# Patient Record
Sex: FEMALE | Race: OTHER | Employment: FULL TIME | URBAN - NONMETROPOLITAN AREA
[De-identification: names, ages, dates, MRNs, and addresses within clinical notes are randomized per-mention and may not be internally consistent; named-entity substitution may affect disease eponyms.]

---

## 2021-10-09 ENCOUNTER — APPOINTMENT (OUTPATIENT)
Dept: GENERAL RADIOLOGY | Age: 26
End: 2021-10-09
Attending: STUDENT IN AN ORGANIZED HEALTH CARE EDUCATION/TRAINING PROGRAM
Payer: OTHER GOVERNMENT

## 2021-10-09 ENCOUNTER — HOSPITAL ENCOUNTER (EMERGENCY)
Age: 26
Discharge: HOME | End: 2021-10-10
Attending: STUDENT IN AN ORGANIZED HEALTH CARE EDUCATION/TRAINING PROGRAM | Admitting: STUDENT IN AN ORGANIZED HEALTH CARE EDUCATION/TRAINING PROGRAM
Payer: OTHER GOVERNMENT

## 2021-10-09 DIAGNOSIS — S82.235A CLOSED NONDISPLACED OBLIQUE FRACTURE OF SHAFT OF LEFT TIBIA, INITIAL ENCOUNTER: Primary | ICD-10-CM

## 2021-10-09 PROCEDURE — 73610 X-RAY EXAM OF ANKLE: CPT

## 2021-10-09 PROCEDURE — 75810000053 HC SPLINT APPLICATION

## 2021-10-09 PROCEDURE — 99283 EMERGENCY DEPT VISIT LOW MDM: CPT

## 2021-10-09 PROCEDURE — 73590 X-RAY EXAM OF LOWER LEG: CPT

## 2021-10-09 PROCEDURE — 74011250637 HC RX REV CODE- 250/637: Performed by: STUDENT IN AN ORGANIZED HEALTH CARE EDUCATION/TRAINING PROGRAM

## 2021-10-09 RX ORDER — ACETAMINOPHEN 500 MG
1000 TABLET ORAL ONCE
Status: COMPLETED | OUTPATIENT
Start: 2021-10-09 | End: 2021-10-09

## 2021-10-09 RX ORDER — HYDROCODONE BITARTRATE AND ACETAMINOPHEN 5; 325 MG/1; MG/1
2 TABLET ORAL
Status: COMPLETED | OUTPATIENT
Start: 2021-10-09 | End: 2021-10-10

## 2021-10-09 RX ORDER — CITALOPRAM 20 MG/1
20 TABLET, FILM COATED ORAL DAILY
COMMUNITY
End: 2021-10-12

## 2021-10-09 RX ORDER — IBUPROFEN 600 MG/1
600 TABLET ORAL ONCE
Status: COMPLETED | OUTPATIENT
Start: 2021-10-09 | End: 2021-10-09

## 2021-10-09 RX ADMIN — IBUPROFEN 600 MG: 600 TABLET, FILM COATED ORAL at 22:37

## 2021-10-09 RX ADMIN — ACETAMINOPHEN 1000 MG: 500 TABLET ORAL at 22:37

## 2021-10-09 NOTE — Clinical Note
[facility] EMERGENCY DEPT  [address] [id number] [phone]    Work/School Note    Date: 10/9/2021    To Whom It May concern:    Erik Lebron was seen and treated today in the emergency room by the following provider(s):  Attending Provider: Ana Roa MD.      Erik Lebron is excused from work/school on 10/10/2021 through 10/13/2021. She is medically clear to return to work/school on 10/14/2021.         Sincerely,          Hayward Prader, MD

## 2021-10-10 VITALS
RESPIRATION RATE: 21 BRPM | SYSTOLIC BLOOD PRESSURE: 131 MMHG | TEMPERATURE: 98.7 F | DIASTOLIC BLOOD PRESSURE: 85 MMHG | BODY MASS INDEX: 28.35 KG/M2 | OXYGEN SATURATION: 95 % | HEART RATE: 58 BPM | WEIGHT: 160 LBS | HEIGHT: 63 IN

## 2021-10-10 PROCEDURE — 74011250637 HC RX REV CODE- 250/637: Performed by: STUDENT IN AN ORGANIZED HEALTH CARE EDUCATION/TRAINING PROGRAM

## 2021-10-10 RX ORDER — IBUPROFEN 600 MG/1
600 TABLET ORAL
Qty: 30 TABLET | Refills: 0 | Status: SHIPPED | OUTPATIENT
Start: 2021-10-10 | End: 2021-11-04 | Stop reason: ALTCHOICE

## 2021-10-10 RX ORDER — HYDROCODONE BITARTRATE AND ACETAMINOPHEN 5; 325 MG/1; MG/1
1 TABLET ORAL
Qty: 12 TABLET | Refills: 0 | Status: SHIPPED | OUTPATIENT
Start: 2021-10-10 | End: 2021-10-13

## 2021-10-10 RX ADMIN — HYDROCODONE BITARTRATE AND ACETAMINOPHEN 2 TABLET: 5; 325 TABLET ORAL at 00:02

## 2021-10-10 NOTE — ED NOTES
Splint applied to lateral and posterior LLE per orders, cap refill < 3 sec after application. Crutches provided, teach back satisfactory.

## 2021-10-10 NOTE — DISCHARGE INSTRUCTIONS
Take Tylenol and Motrin every 6 hours for pain. If this is not enough take Norco as needed. July Ano is an addictive medication and should only be used as needed. Your pain will never fully go away. The point of pain medications is to make the pain tolerable. If you start having severe pain that is uncontrolled, tingling or numbness in your toes this is concerning for something called compartment syndrome and should prompt quick return to the emergency department. Keep your leg elevated.

## 2021-10-10 NOTE — ED NOTES
I have reviewed discharge instructions with the patient. The patient verbalized understanding.     Pt verbalized understanding not to drive while taking norco.

## 2021-10-10 NOTE — ED PROVIDER NOTES
HPI   Patient is a 78-year-old female who presents with left ankle pain. Patient was drinking have a good time with her friends. She states that she was dancing when she felt a sudden severe pain in her left ankle. She is unsure if she twisted it but she was definitely standing when this occurred. She then fell to the ground. She did not hit her head. She did not injure any other part of her body however after was unable to stand on her left foot due to severe pain. She did take 1 hydrocodone prior to arrival but it was not helping with her pain. She states that she feels like her whole foot is tingling and numb. She feels the pain worse to her lateral ankle but does also feel it up in her leg. She denies any other trauma or injury. No past medical history on file. No past surgical history on file. No family history on file. Social History     Socioeconomic History    Marital status: Not on file     Spouse name: Not on file    Number of children: Not on file    Years of education: Not on file    Highest education level: Not on file   Occupational History    Not on file   Tobacco Use    Smoking status: Not on file   Substance and Sexual Activity    Alcohol use: Not on file    Drug use: Not on file    Sexual activity: Not on file   Other Topics Concern    Not on file   Social History Narrative    Not on file     Social Determinants of Health     Financial Resource Strain:     Difficulty of Paying Living Expenses:    Food Insecurity:     Worried About Running Out of Food in the Last Year:     [address] in the Last Year:    Transportation Needs:     Lack of Transportation (Medical):      Lack of Transportation (Non-Medical):    Physical Activity:     Days of Exercise per Week:     Minutes of Exercise per Session:    Stress:     Feeling of Stress :    Social Connections:     Frequency of Communication with Friends and Family:     Frequency of Social Gatherings with Friends and Family:     Attends [employer] Services:     Active Member of Clubs or Organizations:     Attends Club or Organization Meetings:     Marital Status:    Intimate Partner Violence:     Fear of Current or Ex-Partner:     Emotionally Abused:     Physically Abused:     Sexually Abused: ALLERGIES: Patient has no allergy information on record. Review of Systems  Constitutional: No fever  HENT: No ear pain  Eyes: No change in vision  Respiratory: No SOB  Cardio: No chest pain  GI: No blood in stool  : No hematuria  MSK: No back pain  Skin: No rashes  Neuro: No headache    There were no vitals filed for this visit. Physical Exam   General: No acute distress  Head: Normocephalic, atraumatic  Psych: Cooperative and alert  Eyes: No scleral icterus, normal conjunctiva  ENT: Moist oral mucosa  Neck: Supple  CV: Regular rate and rhythm, no pitting edema, palpable DP pulses  Pulm: Clear breath sounds bilaterally without any wheezing or rhonchi, normal respiratory rate  GI: Normal bowel sounds, soft, non-tender  MSK: Tenderness palpation to head of fibula, no tenderness to femur, tenderness palpation to lateral ankle, no tenderness to the medial aspect of the ankle, soft compartments of tibia  Skin: No rashes, mild swelling without any bruising or skin changes noted to left ankle  Neuro: Alert and conversive    MDM   Patient is a 73-year-old female who presents with left ankle pain. Patient does have some minor swelling and tenderness on exam so we will obtain x-rays to look for signs of fracture. She will be given Tylenol and ibuprofen for her pain. Patient is otherwise hemodynamically stable. She is no other concerning findings of trauma or injury. Patient does admit to drinking but does not appear grossly intoxicated and is able to answer all questions appropriately. She is otherwise neurovascularly intact. X-rays show a midshaft oblique tibial fracture. Not displaced.     Spoke with on-call orthopedic surgeon to discuss that this would need surgical management now versus later versus monitoring for compartment syndrome. He states that this can be managed with a posterior mold and sugar tong splint below the knee and close follow-up with orthopedics. We did have a long discussion with the patient and her boyfriend about concerning findings of compartment syndrome and what to look out for. We discussed different regimens to treat her pain at home. Patient was placed in a splint. Afterward she was found to be neurovascularly intact. We discussed different concerns that can happen with a splint that should get a prompt return to the emergency department. Patient will be given crutches and was taught how to use them. Patient stable for discharge at this time. Patient is in agreement with the plan to be discharged at this time. All the patient's questions were answered. Patient was given written instructions on the diagnosis, and states understanding of the plan moving forward. We did discuss important signs and symptoms that should prompt quick return to the emergency department. Disposition: Patient was discharged home in stable condition.   They will follow up with orthopedics    Prescriptions: Norco, ibuprofen    Diagnosis: Acute left-sided tibial fracture  Procedures

## 2021-10-12 ENCOUNTER — OFFICE VISIT (OUTPATIENT)
Dept: ORTHOPEDIC SURGERY | Age: 26
End: 2021-10-12
Payer: OTHER GOVERNMENT

## 2021-10-12 VITALS — HEIGHT: 72 IN | WEIGHT: 165 LBS | BODY MASS INDEX: 22.35 KG/M2

## 2021-10-12 DIAGNOSIS — M25.562 LEFT KNEE PAIN, UNSPECIFIED CHRONICITY: Primary | ICD-10-CM

## 2021-10-12 DIAGNOSIS — S82.402A CLOSED FRACTURE OF LEFT TIBIA AND FIBULA, INITIAL ENCOUNTER: ICD-10-CM

## 2021-10-12 DIAGNOSIS — S82.202A CLOSED FRACTURE OF LEFT TIBIA AND FIBULA, INITIAL ENCOUNTER: ICD-10-CM

## 2021-10-12 PROCEDURE — 27750 TREATMENT OF TIBIA FRACTURE: CPT | Performed by: ORTHOPAEDIC SURGERY

## 2021-10-12 PROCEDURE — 99203 OFFICE O/P NEW LOW 30 MIN: CPT | Performed by: ORTHOPAEDIC SURGERY

## 2021-10-12 RX ORDER — CITALOPRAM 10 MG/1
TABLET ORAL
COMMUNITY
Start: 2021-08-20

## 2021-10-12 RX ORDER — OXYCODONE AND ACETAMINOPHEN 5; 325 MG/1; MG/1
1 TABLET ORAL
Qty: 30 TABLET | Refills: 0 | Status: SHIPPED | OUTPATIENT
Start: 2021-10-12 | End: 2021-10-26

## 2021-10-12 NOTE — PROGRESS NOTES
Name: Kevin Kumar    : 1995     Service Dept: [facility] and Sports Medicine    Patient's Pharmacies:    [address] [place] [address]  [address] [address]  Phone: [phone] Fax: [phone]       Chief Complaint   Patient presents with    Leg Pain        Visit Vitals   6' 1\" (1.854 m)   Wt 165 lb (74.8 kg)   LMP 10/06/2021 (Exact Date)   BMI 21.77 kg/m²      No Known Allergies   Current Outpatient Medications   Medication Sig Dispense Refill    citalopram (CELEXA) 10 mg tablet TAKE 1 TABLET BY MOUTH ONCE DAILY FOR 30 DAYS      HYDROcodone-acetaminophen (Norco) 5-325 mg per tablet Take 1 Tablet by mouth every six (6) hours as needed for Pain for up to 3 days. Max Daily Amount: 4 Tablets. 12 Tablet 0    ibuprofen (MOTRIN) 600 mg tablet Take 1 Tablet by mouth every six (6) hours as needed for Pain. 30 Tablet 0    levonorgestrel (KYLEENA IU) by IntraUTERine route. There is no problem list on file for this patient. History reviewed. No pertinent family history. Social History     Socioeconomic History    Marital status:      Spouse name: Not on file    Number of children: Not on file    Years of education: Not on file    Highest education level: Not on file   Tobacco Use    Smoking status: Never Smoker    Smokeless tobacco: Never Used   Substance and Sexual Activity    Drug use: Not Currently     Social Determinants of Health     Financial Resource Strain:     Difficulty of Paying Living Expenses:    Food Insecurity:     Worried About Running Out of Food in the Last Year:     [address] in the Last Year:    Transportation Needs:     Lack of Transportation (Medical):      Lack of Transportation (Non-Medical):    Physical Activity:     Days of Exercise per Week:     Minutes of Exercise per Session:    Stress:     Feeling of Stress :    Social Connections:     Frequency of Communication with Friends and Family:     Frequency of Social Gatherings with Friends and Family:     Attends Worship Services:     Active Member of Clubs or Organizations:     Attends Club or Organization Meetings:     Marital Status:       History reviewed. No pertinent surgical history. History reviewed. No pertinent past medical history. I have reviewed and agree with [facility] and KODY and intake form in chart and the record furthermore I have reviewed prior medical record(s) regarding this patients care during this appointment. Review of Systems:   Patient is a pleasant appearing individual, appropriately dressed, well hydrated, well nourished, who is alert, appropriately oriented for age, and in no acute distress with a crutch bound gait and normal affect who does not appear to be in any significant pain. Physical Exam:  Right Knee - Full Range of Motion, No crepitation, Grossly neurovascularly intact, Good cap refill, No skin lesion, No effusion, No gross instability, No point tenderness, No quadriceps weakness, No medial or lateral joint line tenderness, Negative Lachman's, Negative Tala's exam.   Encounter Diagnoses     ICD-10-CM ICD-9-CM   1. Left knee pain, unspecified chronicity  M25.562 719.46   2. Closed fracture of left tibia and fibula, initial encounter  S82.202A 823.82    S82.402A        HPI:  The patient is here with a chief complaint of left leg pain, sharp pain. She injured it when she jumped off the couch. It is the same. Nothing has helped. Pain is 8/10. ROS:  10-point review of systems is positive for fracture. X-rays of the left tibia are positive for slight oblique fracture of the tibia, AP and lateral in good alignment. Assessment/Plan:  Plan at this point, short leg cast.  We will repeat x-rays. If it looks good, we will have her continue strict nonweightbearing.   My nurse practitioner will see her back in approximately 2 weeks to repeat x-rays in the cast, AP and lateral. If it looks good, she will continue short leg cast for approximately 8 weeks from the time of her injury until there is callus formation. At that point, she will get converted to a Cam boot, and she will start 25% weightbearing 8 weeks from the time of her injury. She should have no restrictions at approximately 4 months when I will see her back for her final appointment with x-rays for clearance and go from there. As part of continued conservative pain management options the patient was advised to utilize Tylenol or OTC NSAIDS as long as it is not medically contraindicated. Return to Office: Follow-up and Dispositions    · Return in about 2 weeks (around 10/26/2021) for w/ Richa Kaminski, w/ xrays. Scribed by Brady Chand LPN as dictated by [facility] KATHY Lua MD.  Documentation True and Accepted Jimbo Lua MD

## 2021-10-12 NOTE — PATIENT INSTRUCTIONS
Knee Arthritis: Care Instructions  Your Care Instructions     Knee arthritis is a breakdown of the cartilage that cushions your knee joint. When the cartilage wears down, your bones rub against each other. This causes pain and stiffness. Knee arthritis tends to get worse with time. Treatment for knee arthritis involves reducing pain, making the leg muscles stronger, and staying at a healthy body weight. The treatment usually does not improve the health of the cartilage, but it can reduce pain and improve how well your knee works. You can take simple measures to protect your knee joints, ease your pain, and help you stay active. Follow-up care is a key part of your treatment and safety. Be sure to make and go to all appointments, and call your doctor if you are having problems. It's also a good idea to know your test results and keep a list of the medicines you take. How can you care for yourself at home? · Know that knee arthritis will cause more pain on some days than on others. · Stay at a healthy weight. Lose weight if you are overweight. When you stand up, the pressure on your knees from every pound of body weight is multiplied four times. So if you lose 10 pounds, you will reduce the pressure on your knees by 40 pounds. · Talk to your doctor or physical therapist about exercises that will help ease joint pain. ? Stretch to help prevent stiffness and to prevent injury before you exercise. You may enjoy gentle forms of yoga to help keep your knee joints and muscles flexible. ? Walk instead of jog.  ? Ride a bike. This makes your thigh muscles stronger and takes pressure off your knee. ? Wear well-fitting and comfortable shoes. ? Exercise in chest-deep water. This can help you exercise longer with less pain. ? Avoid exercises that include squatting or kneeling. They can put a lot of strain on your knees.   ? Talk to your doctor to make sure that the exercise you do is not making the arthritis worse.  · Do not sit for long periods of time. Try to walk once in a while to keep your knee from getting stiff. · Ask your doctor or physical therapist whether shoe inserts may reduce your arthritis pain. · If you can afford it, get new athletic shoes at least every year. This can help reduce the strain on your knees. · Use a device to help you do everyday activities. ? A cane or walking stick can help you keep your balance when you walk. Hold the cane or walking stick in the hand opposite the painful knee. ? If you feel like you may fall when you walk, try using crutches or a front-wheeled walker. These can prevent falls that could cause more damage to your knee. ? A knee brace may help keep your knee stable and prevent pain. ? You also can use other things to make life easier, such as a higher toilet seat and handrails in the bathtub or shower. · Take pain medicines exactly as directed. ? Do not wait until you are in severe pain. You will get better results if you take it sooner. ? If you are not taking a prescription pain medicine, take an over-the-counter medicine such as acetaminophen (Tylenol), ibuprofen (Advil, Motrin), or naproxen (Aleve). Read and follow all instructions on the label. ? Do not take two or more pain medicines at the same time unless the doctor told you to. Many pain medicines have acetaminophen, which is Tylenol. Too much acetaminophen (Tylenol) can be harmful. ? Tell your doctor if you take a blood thinner, have diabetes, or have allergies to shellfish. · Ask your doctor if you might benefit from a shot of steroid medicine into your knee. This may provide pain relief for several months. · Many people take the supplements glucosamine and chondroitin for osteoarthritis. Some people feel they help, but the medical research does not show that they work. Talk to your doctor before you take these supplements. When should you call for help?    Call your doctor now or seek immediate medical care if:    · You have sudden swelling, warmth, or pain in your knee.     · You have knee pain and a fever or rash.     · You have such bad pain that you cannot use your knee. Watch closely for changes in your health, and be sure to contact your doctor if you have any problems. Where can you learn more? Go to http://www.gray.com/  Enter W187 in the search box to learn more about \"Knee Arthritis: Care Instructions. \"  Current as of: April 30, 2021               Content Version: 13.0  © 1796-7545 [employer]. Care instructions adapted under license by [employer] (which disclaims liability or warranty for this information). If you have questions about a medical condition or this instruction, always ask your healthcare professional. [address] any warranty or liability for your use of this information.

## 2021-10-28 ENCOUNTER — OFFICE VISIT (OUTPATIENT)
Dept: ORTHOPEDIC SURGERY | Age: 26
End: 2021-10-28
Payer: OTHER GOVERNMENT

## 2021-10-28 DIAGNOSIS — Z09 FRACTURE FOLLOW-UP: Primary | ICD-10-CM

## 2021-10-28 PROCEDURE — 99024 POSTOP FOLLOW-UP VISIT: CPT | Performed by: NURSE PRACTITIONER

## 2021-10-28 NOTE — PROGRESS NOTES
Name: Katiana Thurman    : 1995    Weight Loss Metrics 10/12/2021 10/9/2021   Today's Wt 165 lb 160 lb   BMI 21.77 kg/m2 28.34 kg/m2       There is no height or weight on file to calculate BMI. Service Dept: [address] [place] [address] and Sports Medicine    Patient's Pharmacies:    [facility] [address] [place] [address]  [address] [address]  Phone: [phone] Fax: [phone]       Chief Complaint   Patient presents with    Leg Pain       HPI:  Patient follows up today for repeat x-rays and clinical check of a left tibia fracture. She is now 2 weeks status post that injury and remains in a short leg cast nonweightbearing on crutches. Visit Vitals  LMP 10/06/2021 (Exact Date)      No Known Allergies   Current Outpatient Medications   Medication Sig Dispense Refill    citalopram (CELEXA) 10 mg tablet TAKE 1 TABLET BY MOUTH ONCE DAILY FOR 30 DAYS      ibuprofen (MOTRIN) 600 mg tablet Take 1 Tablet by mouth every six (6) hours as needed for Pain. 30 Tablet 0    levonorgestrel (KYLEENA IU) by IntraUTERine route. There is no problem list on file for this patient. History reviewed. No pertinent family history. Social History     Socioeconomic History    Marital status:      Spouse name: Not on file    Number of children: Not on file    Years of education: Not on file    Highest education level: Not on file   Tobacco Use    Smoking status: Never Smoker    Smokeless tobacco: Never Used   Substance and Sexual Activity    Drug use: Not Currently     Social Determinants of Health     Financial Resource Strain:     Difficulty of Paying Living Expenses:    Food Insecurity:     Worried About Running Out of Food in the Last Year:     [address] in the Last Year:    Transportation Needs:     Lack of Transportation (Medical):      Lack of Transportation (Non-Medical):    Physical Activity:     Days of Exercise per Week:     Minutes of Exercise per Session:    Stress:     Feeling of Stress :    Social Connections:     Frequency of Communication with Friends and Family:     Frequency of Social Gatherings with Friends and Family:     Attends Islam Services:     Active Member of Clubs or Organizations:     Attends Club or Organization Meetings:     Marital Status:       History reviewed. No pertinent surgical history. History reviewed. No pertinent past medical history. I have reviewed and agree with [address] [facility] [address] and ROS and intake form in chart and the record furthermore I have reviewed prior medical record(s) regarding this patients care during this appointment. Review of Systems:   Patient is pleasant appearing individual, appropriately dressed, well hydrated, well nourished, who is alert, appropriately oriented for age, and in no acute distress with a normal affect who does not appear to be in any significant pain. Physical Exam:  On physical exam today the cast appears in good condition, dry and intact. Toes are warm and pink and she is able to move them all. X-rays taken today of the left tib-fib in the cast show her fracture to remain stable. There is no sign of callus formation yet. Encounter Diagnoses     ICD-10-CM ICD-9-CM   1. Fracture follow-up  Z09 V67.4       As part of continued conservative pain management options the patient was advised to utilize Tylenol or OTC NSAIDS as long as it is not medically contraindicated. Return to Office:   Today I advised the patient to continue nonweightbearing on her crutches. She will remain in the cast for 6 more weeks and return at that time for removal.  We will get new x-rays of the left tib-fib at that visit and plan to advance her to a walking boot beginning with 25% weightbearing. We will plan on no restrictions at approximately 4 months from the date of her injury. She was given a letter today allowing her to return to desk duty at work only. [address] [facility]

## 2021-10-28 NOTE — LETTER
NOTIFICATION RETURN TO WORK / SCHOOL    10/28/2021 10:59 AM    Ms. [address] [address]      To Whom It May Concern:    Domenico Figueroa is currently under the care of [address] [place] [address]. She will return to work on: 11/1/2021 with desk duty restrictions for 6 weeks due to a tibia fracture. Patient should be allowed to elevate the left leg while working at her desk. If there are questions or concerns please have the patient contact our office.         Sincerely,      NITZA Gloria

## 2021-11-04 ENCOUNTER — OFFICE VISIT (OUTPATIENT)
Dept: ORTHOPEDIC SURGERY | Age: 26
End: 2021-11-04
Payer: OTHER GOVERNMENT

## 2021-11-04 DIAGNOSIS — Z09 FRACTURE FOLLOW-UP: Primary | ICD-10-CM

## 2021-11-04 PROCEDURE — 99024 POSTOP FOLLOW-UP VISIT: CPT | Performed by: NURSE PRACTITIONER

## 2021-11-04 RX ORDER — IBUPROFEN 800 MG/1
800 TABLET ORAL
Qty: 60 TABLET | Refills: 1 | Status: SHIPPED | OUTPATIENT
Start: 2021-11-04

## 2021-11-04 RX ORDER — BACLOFEN 20 MG/1
20 TABLET ORAL
Qty: 30 TABLET | Refills: 1 | Status: SHIPPED | OUTPATIENT
Start: 2021-11-04

## 2021-11-04 NOTE — PROGRESS NOTES
Name: Nahum Penaloza    : 1995    Weight Loss Metrics 10/12/2021 10/9/2021   Today's Wt 165 lb 160 lb   BMI 21.77 kg/m2 28.34 kg/m2       There is no height or weight on file to calculate BMI. Service Dept: [facility] and Sports Medicine    Patient's Pharmacies:    [facility] [address] [place] [address]  [address] [address]  Phone: [phone] Fax: [phone]       Chief Complaint   Patient presents with    Leg Pain       HPI:  Patient follows up today with complaint of severe numbness tingling and cramping throughout her left leg which started suddenly around 10 AM this morning. She states that she has been doing desk work at her job but she is still nonweightbearing on crutches. Otherwise no new injuries. She denies any back pain associated with her symptoms. Visit Vitals  LMP 10/06/2021 (Exact Date)      No Known Allergies   Current Outpatient Medications   Medication Sig Dispense Refill    ibuprofen (MOTRIN) 800 mg tablet Take 1 Tablet by mouth three (3) times daily as needed for Pain. 60 Tablet 1    baclofen (LIORESAL) 20 mg tablet Take 1 Tablet by mouth three (3) times daily as needed for Pain. 30 Tablet 1    citalopram (CELEXA) 10 mg tablet TAKE 1 TABLET BY MOUTH ONCE DAILY FOR 30 DAYS      levonorgestrel (KYLEENA IU) by IntraUTERine route. There is no problem list on file for this patient. History reviewed. No pertinent family history. Social History     Socioeconomic History    Marital status:    Tobacco Use    Smoking status: Never Smoker    Smokeless tobacco: Never Used   Substance and Sexual Activity    Drug use: Not Currently      History reviewed. No pertinent surgical history. History reviewed. No pertinent past medical history.      I have reviewed and agree with [address] [place] [address] Nw and ROS and intake form in chart and the record furthermore I have reviewed prior medical record(s) regarding this patients care during this appointment. Review of Systems:   Patient is pleasant appearing individual, appropriately dressed, well hydrated, well nourished, who is alert, appropriately oriented for age, and in moderate pain. Physical Exam:  On physical exam today she does have tenderness in her left glutes. Sensation is intact to her toes and she is able to wiggle all of them. X-rays taken of her left tib-fib in the cast show no change in her fracture alignment. Encounter Diagnoses     ICD-10-CM ICD-9-CM   1. Fracture follow-up  Z09 V67.4       As part of continued conservative pain management options the patient was advised to utilize Tylenol or OTC NSAIDS as long as it is not medically contraindicated. Return to Office:   Today I reviewed the clinical and radiographic findings with the patient. I think that her symptoms are likely due to the way she is having to hold the leg while she is nonweightbearing on crutches. They could also be radicular from a pinched nerve in her back. I am going to send in prescriptions for ibuprofen 800 mg as well as a muscle relaxer for the cramping. I am going to write her off of work until Monday, 11/8/2021 and I have encouraged her to use heat and rest over the next few days with her medications. She will follow up at her next regularly scheduled appointment for danica-rays of the left tib-fib.          [address] [facility]

## 2021-11-24 ENCOUNTER — OFFICE VISIT (OUTPATIENT)
Dept: ORTHOPEDIC SURGERY | Age: 26
End: 2021-11-24
Payer: OTHER GOVERNMENT

## 2021-11-24 DIAGNOSIS — M89.8X6 PAIN IN LEFT TIBIA: Primary | ICD-10-CM

## 2021-11-24 PROCEDURE — 99024 POSTOP FOLLOW-UP VISIT: CPT | Performed by: ORTHOPAEDIC SURGERY

## 2021-11-24 PROCEDURE — L4360 PNEUMAT WALKING BOOT PRE CST: HCPCS | Performed by: ORTHOPAEDIC SURGERY

## 2021-11-24 NOTE — PATIENT INSTRUCTIONS
Leg Pain: Care Instructions  Your Care Instructions  Many things can cause leg pain. Too much exercise or overuse can cause a muscle cramp (or charley horse). You can get leg cramps from not eating a balanced diet that has enough potassium, calcium, and other minerals. If you do not drink enough fluids or are taking certain medicines, you may develop leg cramps. Other causes of leg pain include injuries, blood flow problems, nerve damage, and twisted and enlarged veins (varicose veins). You can usually ease pain with self-care. Your doctor may recommend that you rest your leg and keep it elevated. Follow-up care is a key part of your treatment and safety. Be sure to make and go to all appointments, and call your doctor if you are having problems. It's also a good idea to know your test results and keep a list of the medicines you take. How can you care for yourself at home? · Take pain medicines exactly as directed. ? If the doctor gave you a prescription medicine for pain, take it as prescribed. ? If you are not taking a prescription pain medicine, ask your doctor if you can take an over-the-counter medicine. · Take any other medicines exactly as prescribed. Call your doctor if you think you are having a problem with your medicine. · Rest your leg while you have pain, and avoid standing for long periods of time. · Prop up your leg at or above the level of your heart when possible. · Make sure you are eating a balanced diet that is rich in calcium, potassium, and magnesium, especially if you are pregnant. · If directed by your doctor, put ice or a cold pack on the area for 10 to 20 minutes at a time. Put a thin cloth between the ice and your skin. · Your leg may be in a splint, a brace, or an elastic bandage, and you may have crutches to help you walk. Follow your doctor's directions about how long to wear supports and how to use the crutches. When should you call for help?    Call 911 anytime you think you may need emergency care. For example, call if:    · You have sudden chest pain and shortness of breath, or you cough up blood.     · Your leg is cool or pale or changes color. Call your doctor now or seek immediate medical care if:    · You have increasing or severe pain.     · Your leg suddenly feels weak and you cannot move it.     · You have signs of a blood clot, such as:  ? Pain in your calf, back of the knee, thigh, or groin. ? Redness and swelling in your leg or groin.     · You have signs of infection, such as:  ? Increased pain, swelling, warmth, or redness. ? Red streaks leading from the sore area. ? Pus draining from a place on your leg. ? A fever.     · You cannot bear weight on your leg. Watch closely for changes in your health, and be sure to contact your doctor if:    · You do not get better as expected. Where can you learn more? Go to http://www.gray.com/  Enter F224 in the search box to learn more about \"Leg Pain: Care Instructions. \"  Current as of: July 1, 2021               Content Version: 13.0  © 8745-0049 [employer]. Care instructions adapted under license by [employer] (which disclaims liability or warranty for this information). If you have questions about a medical condition or this instruction, always ask your healthcare professional. [place] [address] any warranty or liability for your use of this information.

## 2021-11-24 NOTE — PROGRESS NOTES
Name: Linsey Cordero    : 1995     Service Dept: [address] [place] [address] and Sports Medicine    Chief Complaint   Patient presents with    Leg Pain        There were no vitals taken for this visit. No Known Allergies     Current Outpatient Medications   Medication Sig Dispense Refill    ibuprofen (MOTRIN) 800 mg tablet Take 1 Tablet by mouth three (3) times daily as needed for Pain. 60 Tablet 1    baclofen (LIORESAL) 20 mg tablet Take 1 Tablet by mouth three (3) times daily as needed for Pain. 30 Tablet 1    citalopram (CELEXA) 10 mg tablet TAKE 1 TABLET BY MOUTH ONCE DAILY FOR 30 DAYS      levonorgestrel (KYLEENA IU) by IntraUTERine route. There is no problem list on file for this patient. History reviewed. No pertinent family history. Social History     Socioeconomic History    Marital status:    Tobacco Use    Smoking status: Never Smoker    Smokeless tobacco: Never Used   Substance and Sexual Activity    Drug use: Not Currently      History reviewed. No pertinent surgical history. History reviewed. No pertinent past medical history. I have reviewed and agree with [facility] and ROS and intake form in chart and the record furthermore I have reviewed prior medical record(s) regarding this patients care during this appointment. Review of Systems:   Patient is a pleasant appearing individual, appropriately dressed, well hydrated, well nourished, who is alert, appropriately oriented for age, and in no acute distress with a normal gait and normal affect who does not appear to be in any significant pain. Please note that a DME was provided from our office and fitted to an appropriate size. DME provided will help decrease soft tissue swelling, assist with stabilization, and decrease pain with immobilization.     Right Knee - Full Range of Motion, No crepitation, Grossly neurovascularly intact, Good cap refill, No skin lesion, No effusion, No gross instability, No point tenderness, No quadriceps weakness, No medial or lateral joint line tenderness, Negative Lachman's, Negative Tala's exam.      Encounter Diagnoses     ICD-10-CM ICD-9-CM   1. Pain in left tibia  M89.8X6 733.90       HPI:  The patient is here with a chief complaint of left leg pain. Diagnosed with tib-fib fracture. We put her into a cast.  Her injury was on 10/10/2021. She is doing well. She got her cast wet. She is approximately 6 to 7 weeks out from her injury. X-rays show that she has got well-aligned fracture with some early callus formation but no full healing. Assessment/Plan:  Plan at this point, we will cut the cast off, and we will repeat x-rays in about 4 to 6 weeks. We will convert her into a Cam boot, strict nonweightbearing for now. Next time, she sees my nurse practitioner, she will be approximately 2-1/2 months out, and she will go to progressive weightbearing 25% and increase by 25% each week next time my nurse practitioner sees her back. I will see her one more time after that, in approximately 4 weeks after doing that, and we will also put her into formal therapy next time as well, and we will go from there. As part of continued conservative pain management options the patient was advised to utilize Tylenol or OTC NSAIDS as long as it is not medically contraindicated. Return to Office: Follow-up and Dispositions    · Return in 4 weeks (on 12/22/2021) for jose ramon Mandel/ Blanca. Follow-up and Disposition History           Scribed by Mercedez Clark as dictated by Alessandra Pat. Shashi Kunz MD.  Documentation True and Accepted Jimbo Kunz MD

## 2021-12-30 ENCOUNTER — OFFICE VISIT (OUTPATIENT)
Dept: ORTHOPEDIC SURGERY | Age: 26
End: 2021-12-30
Payer: OTHER GOVERNMENT

## 2021-12-30 DIAGNOSIS — S82.232D CLOSED DISPLACED OBLIQUE FRACTURE OF SHAFT OF LEFT TIBIA WITH ROUTINE HEALING, SUBSEQUENT ENCOUNTER: Primary | ICD-10-CM

## 2021-12-30 PROCEDURE — 99024 POSTOP FOLLOW-UP VISIT: CPT | Performed by: NURSE PRACTITIONER

## 2021-12-30 NOTE — PROGRESS NOTES
Name: Maurisio Durbin    : 1995    Weight Loss Metrics 10/12/2021 10/9/2021   Today's Wt 165 lb 160 lb   BMI 21.77 kg/m2 28.34 kg/m2       There is no height or weight on file to calculate BMI. Service Dept: [address] [place] [address] and Sports Medicine    Patient's Pharmacies:    [facility] [address] [place] [address], [address] [place]  [address] [facility]  [address] [address]  Phone: [phone] Fax: [phone]       No chief complaint on file. HPI:  Patient follows up today for repeat x-rays and evaluation of a left tibia fracture. Initial injury was on 10/9/2021. She is now in a cam boot and ambulating nonweightbearing with crutches. She has no complaints today. There were no vitals taken for this visit. No Known Allergies   Current Outpatient Medications   Medication Sig Dispense Refill    ibuprofen (MOTRIN) 800 mg tablet Take 1 Tablet by mouth three (3) times daily as needed for Pain. 60 Tablet 1    baclofen (LIORESAL) 20 mg tablet Take 1 Tablet by mouth three (3) times daily as needed for Pain. 30 Tablet 1    citalopram (CELEXA) 10 mg tablet TAKE 1 TABLET BY MOUTH ONCE DAILY FOR 30 DAYS      levonorgestrel (KYLEENA IU) by IntraUTERine route. There is no problem list on file for this patient. History reviewed. No pertinent family history. Social History     Socioeconomic History    Marital status:    Tobacco Use    Smoking status: Never Smoker    Smokeless tobacco: Never Used   Substance and Sexual Activity    Drug use: Not Currently      History reviewed. No pertinent surgical history. History reviewed. No pertinent past medical history. I have reviewed and agree with [address] [place] [address] Nw and ROS and intake form in chart and the record furthermore I have reviewed prior medical record(s) regarding this patients care during this appointment. Physical Exam:  On physical exam today there is minimal swelling along the left lower leg ankle and foot.   She is neurovascularly intact. Repeat x-rays taken today of the left tib-fib show progressive interval healing of her tibia fracture. Encounter Diagnoses     ICD-10-CM ICD-9-CM   1. Closed displaced oblique fracture of shaft of left tibia with routine healing, subsequent encounter  S82.232D V54.16       As part of continued conservative pain management options the patient was advised to utilize Tylenol or OTC NSAIDS as long as it is not medically contraindicated. Return to Office: They reviewed the clinical and radiographic findings with the patient and recommended that she could begin progressive weightbearing on the left lower extremity. She will start with toe-touch this week with both crutches and then progressed by 25% weightbearing each week thereafter until she reaches full weightbearing in about another month. She will continue in the boot through this course. I will also order physical therapy to begin with some lower extremity strengthening and she will follow up in 4 more weeks for a repeat weightbearing x-ray of the left tib-fib.          [address] [facility]

## 2022-01-05 ENCOUNTER — HOSPITAL ENCOUNTER (OUTPATIENT)
Dept: PHYSICAL THERAPY | Age: 27
Discharge: HOME | End: 2022-01-05
Payer: OTHER GOVERNMENT

## 2022-01-05 PROCEDURE — 97162 PT EVAL MOD COMPLEX 30 MIN: CPT

## 2022-01-05 PROCEDURE — 97530 THERAPEUTIC ACTIVITIES: CPT

## 2022-01-05 NOTE — PROGRESS NOTES
[address] [facility] Kristina Haas, [address] [facility], [address] [place] [address]  PLAN OF CARE / STATEMENT OF MEDICAL NECESSITY FOR PHYSICAL THERAPY SERVICES  Patient Name: Arya Joy : 1995   Medical   Diagnosis: Displaced oblique fracture of shaft of left tibia, subsequent encounter for closed fracture with routine healing [S82.232D] Treatment Diagnosis: R26.2   Onset Date: 10/6/21     Referral Source: [facility] Gallardo [facility]): 2022   Prior Hospitalization: See medical history Provider #: [id number]   Prior Level of Function: Ind   Comorbidities:    Medications: Verified on Patient Summary List   The Plan of Care and following information is based on the information from the initial evaluation.   ==========================================================================================  Assessment / Functional Analysis:    Pt is a 22 y/o female who presents to physical therapy s/p L tibia spiral fracture of shaft on 10/6/21 after a fall. Pt attended orthopedic follow-up appointment last week and was given TTWB status with 25% aded each week until she is FWB. Pt received in waiting area ambulating with crutches and boot on L lower leg. Pt presents today with decreased ankle ROM, decreased strength, increased pain, and decreased ability to ambulate with normalized gait pattern.  Pt could benefit from skilled PT services to address the above impairments and to improve Pt ability to participate in functional activities of choice.    ==========================================================================================  Eval Complexity: History: MEDIUM  Complexity : 1-2 comorbidities / personal factors will impact the outcome/ POC Exam:LOW Complexity : 1-2 Standardized tests and measures addressing body structure, function, activity limitation and / or participation in recreation  Presentation: LOW Complexity : Stable, uncomplicated  Clinical Decision Making:MEDIUM Complexity : FOTO score of 26-74Overall Complexity:MEDIUM    Problem List: decrease ROM, decrease strength, edema affecting function, impaired gait/ balance, decrease ADL/ functional abilitiies, decrease activity tolerance, decrease flexibility/ joint mobility and decrease transfer abilities   Treatment Plan may include any combination of the following: Therapeutic exercise, Therapeutic activities, Neuromuscular re-education, Physical agent/modality, Gait/balance training, Manual therapy and Patient education  Patient / Family readiness to learn indicated by: asking questions and interest  Persons(s) to be included in education: patient (P)  Barriers to Learning/Limitations: None      Patient self reported health status: good  Rehabilitation Potential: excellent    Objective Measures: FOTO: 44  LEFS: 28.4      ROM / Strength  []? Unable to assess                            AROM                         PROM                       Strength       Left Right Left Right Left Right   Hip Flexion                 Extension                 Abduction                 Adduction               Knee Flexion                 Extension               Ankle Plantarflexion 28 37     2+/5  5/5     Dorsiflexion  Inversion  Eversion 6  30  7 15  41  16     2+/5   2+/5   2+/5   5/5  5/5  5/5        Short Term Goals: 3 weeks  1. Pt will report compliance and demonstrate ability to correctly perform HEP. 2. Pt will demonstrate a 1/2 grade improvement in LLE MMT to be able to better ambulate with a normalized gait without pain. 3. Pt will demonstrate L ankle PROM of 70 deg total DF/PF to facilitate increased ability to ascend/descend stairs. 4. Pt will increase LEFS > 10 points to facilitate increased ability to perform functional activities of choice. Long Term Goals: 6 weeks  1. Pt will demonstrate a 2 grade improvement in LLE MMT to be able to better ambulate with a normalized gait without pain.   2. Pt will demonstrate L ankle AROM of 80 deg or greater to be able to return to normal ambulation on level and unlevel surfaces. 3. Pt will demonstrate the ability to safely ambulate > 500 feet without an AD without evidence of antalgia to be able to return to an ind walking program following discharge. 4. Pt will increase LEFS > 20 points to facilitate increased ability to perform leisure activities of choice. Frequency / Duration: Patient to be seen  1-2  times per week for 6  weeks:  Patient / Caregiver education and instruction: self care, activity modification, brace/ splint application and exercises    Therapist Signature: Jeffery Flores PT, DPT Date: 3/8/9787   Certification Period: 1/5/22 - 3/5/22 Time: 10:36 AM   ===========================================================================================  I certify that the above Physical Therapy Services are being furnished while the patient is under my care. I agree with the treatment plan and certify that this therapy is necessary. Physician Signature:        Date:       Time:     Please sign and return to [address] [place] [address] PT or you may fax the signed copy to [phone]. Please call [phone] if more information required. Thank you.

## 2022-01-05 NOTE — PROGRESS NOTES
ANKLE EVAL/ PT DAILY TREATMENT NOTE 10-18    Patient Name: Camden Ott  Date:2022  : 1995  [x]  Patient  Verified  Payor: AGUILAR / Plan: Alirio Gibson 74 / Product Type:  /    In time: 818  Out time: 903  Total Treatment Time (min): 45  Visit #: 1    Medicare/BCBS Only   Total Timed Codes (min):  15 1:1 Treatment Time:  45     Treatment Area: Displaced oblique fracture of shaft of left tibia, subsequent encounter for closed fracture with routine healing [S82.232D]    SUBJECTIVE  Pt is a 22 y/o female who presents to physical therapy s/p L tibia spiral fracture on 10/6/21 after a fall. Pt attended orthopedic follow-up appointment last week and was given TTWB status with 25% aded each week until she is FWB. Pt received in waiting area ambulating with crutches and boot on L lower leg. Pt presents today with decreased ankle ROM, decreased strength, increased pain, and decreased ability to ambulate with normalized gait pattern. Pt could benefit from skilled PT services to address the above impairments and to improve Pt ability to participate in functional activities of choice. Pt. Goals: get back to running, be able to stand more than 15 minutes     Pain Level (0-10 scale): Current: 0/10 Best: 0/10  Worst: 0/10    []constant []intermittent []improving []worsening []no change since onset      PMH: NA    Imagin21 X-rays taken of the left tib-fib show progressive interval healing of her tibia fracture.   Prior Treatment: NA      Any medication changes, allergies to medications, adverse drug reactions, diagnosis change, or new procedure performed?: [x] No    [] Yes (see summary sheet for update)          OBJECTIVE/EXAMINATION  Palpation: No tenderness to palpation    Posture: [] Varus    [] Valgus          [] Tibial Torsion    [] Foot Supination    [] Foot Pronation    Describe:      ROM / Strength  [] Unable to assess                    AROM                         PROM Strength     Left Right Left Right Left Right   Hip Flexion          Extension          Abduction          Adduction         Knee Flexion          Extension         Ankle Plantarflexion 28 37    5/5    Dorsiflexion  Inversion  Eversion 6 15  41  16    5/5  5/5  5/5       Flexibility: [] Unable to assess at this time  Hamstrings (measured passively in 90/90 position) :    Left   degrees  Right   degrees    Quadriceps (measured passively in prone):    Left   degrees  Right   degrees    Gastrocnemius (measured passively in prone):      Left   degrees  Right   degrees    Girth Measurements:     Inframalleolar              Left   cm     Right   cm    Figure 8:            Left   cm     Right   cm    Metatarsal heads:        Left   cm     Right    cm     Special tests:  Anterior drawer  [] Neg    [] Pos   Talar tilt   [] Neg    [] Pos   Long bone comp [] Neg    [] Pos   Navicular drop  [] Neg    [] Pos   Feiss Line  [] Neg    [] Pos   Squeeze Test  [] Neg    [] Pos                    .    Gait:  [] Normal    [x] Abnormal    [] Antalgic    [] NWB    Device: B axillary crutches    Distance: household distances  Comments: 25% WB currently       Balance: RLE intact    Other tests/comments:   FOTO: 44  LEFS: 28.4       Modality rationale:    Min Type Additional Details    [] Estim:  []Unatt       []IFC  []Premod                        []Other:  []w/ice   []w/heat  Position:  Location:    [] Estim: []Att    []TENS instruct  []NMES                    []Other:  []w/US   []w/ice   []w/heat  Position:  Location:         []  Ultrasound: []Continuous   [] Pulsed                           []1MHz   []3MHz Location:  W/cm2:         []  Ice     []  heat  []  Ice massage  []  Laser   []  Anodyne Position:  Location:         []  Vasopneumatic Device Pressure:       [] lo [] med [] hi   Temperature: [] lo [] med [] hi   [] Skin assessment post-treatment:  []intact []redness- no adverse reaction    []redness  adverse reaction:     30 min [x]Eval                  []Re-Eval        min Therapeutic Exercise:  [] See flow sheet :       15 min Therapeutic Activity:  []  See flow sheet :   Rationale: increase ROM and increase strength  to improve the patients ability to ambulate             With   [] TE   [] TA   [] neuro   [] other: Patient Education: [x] Review HEP    [] Progressed/Changed HEP based on:   [] positioning   [] body mechanics   [] transfers   [] heat/ice application    [] other:        Pain Level (0-10 scale) post treatment: 0/10      ASSESSMENT/Functional Analysis     [x]  See plan of care  []  Discharge due to:_  []  Other:_      Roger Lofton, PT 1/5/2022  8:25 AM

## 2022-01-07 ENCOUNTER — HOSPITAL ENCOUNTER (OUTPATIENT)
Dept: PHYSICAL THERAPY | Age: 27
Discharge: HOME | End: 2022-01-07
Payer: OTHER GOVERNMENT

## 2022-01-07 PROCEDURE — 97110 THERAPEUTIC EXERCISES: CPT

## 2022-01-07 NOTE — PROGRESS NOTES
Patient Name: Oxana Last  CBXG:3[birth year]8/[birth year]  : 1995  [x]  Patient  Verified  Payor:  / Plan: Alirio Gibson 74 / Product Type:  /    In time: 1300  Out time:1345  Total Treatment Time (min): 45  Total Timed Codes (min): 45  1:1 Treatment Time (min): 45   Visit #: 2     Treatment Area: Displaced oblique fracture of shaft of left tibia, subsequent encounter for closed fracture with routine healing [J77.245T]    SUBJECTIVE  Patient enters using axillary crutches. She is adhering to her weight bearing status. Pain Level (0-10 scale): 0/10    Any medication changes, allergies to medications, adverse drug reactions, diagnosis change, or new procedure performed?: [x] No    [] Yes (see summary sheet for update)        OBJECTIVE  Modality rationale: Patient refused   Min Type Additional Details    [] Estim: []Att   []Unatt  []TENS instruct                 []IFC  []Premod []NMES                       []Other:  []w/US   []w/ice   []w/heat  Position:  Location:    []  Traction: [] Cervical       []Lumbar                       [] Prone          []Supine                       []Intermittent   []Continuous Lbs:  [] before manual  [] after manual    []  Ultrasound: []Continuous   [] Pulsed                           []1MHz   []3MHz Location:  W/cm2:    []  Ice     []  heat  []  Ice massage Position:  Location:    []  Vasopneumatic Device Pressure: [] lo [] med [] hi   Temp: [] lo [] med [] hi   [] Skin assessment post-treatment:  []intact []redness- no adverse reaction       []redness  adverse reaction:       45 min Therapeutic Exercise:  [x] See flow sheet :   Rationale: increase ROM and increase strength to improve the patients ability to return to her PLOF.      min Therapeutic Activity:  []  See flow sheet :   Rationale:       min Neuromuscular Re-education:  []  See flow sheet :   Rationale:   to improve the patients ability to      min Manual Therapy:     Rationale:  to      min Gait Training:  ___ feet with ___ device on level surfaces with ___ level of assist   Rationale: With TE  TA   NR  GT   [facility] Patient Education: [x] Review HEP    [] Progressed/Changed HEP based on:   [] positioning   [] body mechanics   [] transfers   [] heat/ice application        Pain Level (0-10 scale) post treatment: 0/10    ASSESSMENT/Changes in Function: Session began with HEP review via self stretching via gastroc, PF, DF and great toe stretch. Followed by AROM via ABC's, AROM of the L ankle. Followed by 4-way ankle with graded resistance band. Introduced towel scrunch, ankle IN/EV on towel, marble  (intrenstic)  and seated calf raises. With boot on, ambulation on level surface with adherence to weight bearing status, 25%. Plan to continue with POC. Patient will continue to benefit from skilled PT services to modify and progress therapeutic interventions, address functional mobility deficits, address ROM deficits, address strength deficits, analyze and cue movement patterns, analyze and modify body mechanics/ergonomics and assess and modify postural abnormalities to attain remaining goals.      [x]  See Plan of Care  []  See progress note/recertification  []  See Discharge Summary           PLAN  []  Upgrade activities as tolerated     [x]  Continue plan of care  []  Update interventions per flow sheet       []  Discharge due to:_  []  Other:_      REYNOLD Sigala 1/7/2022  1:47 PM

## 2022-01-10 ENCOUNTER — HOSPITAL ENCOUNTER (OUTPATIENT)
Dept: PHYSICAL THERAPY | Age: 27
Discharge: HOME | End: 2022-01-10
Payer: OTHER GOVERNMENT

## 2022-01-10 PROCEDURE — 97110 THERAPEUTIC EXERCISES: CPT

## 2022-01-10 NOTE — PROGRESS NOTES
Patient Name: Juan Starr  Date:1/10/2022  : 1995  [x]  Patient  Verified  Payor:  / Plan: Hyun Montano / Product Type:  /    In time: 08:02  Out time: 08:48  Total Treatment Time (min): 46  Total Timed Codes (min): 46  1:1 Treatment Time (min): 46   Visit #: 3     Treatment Area: Displaced oblique fracture of shaft of left tibia, subsequent encounter for closed fracture with routine healing [Z26.711Z]    SUBJECTIVE  Patient enters using axillary crutches. She is adhering to her weight bearing status. No complaints this date. Pain Level (0-10 scale): 0/10    Any medication changes, allergies to medications, adverse drug reactions, diagnosis change, or new procedure performed?: [x] No    [] Yes (see summary sheet for update)        OBJECTIVE  Modality rationale: Patient refused   Min Type Additional Details    [] Estim: []Att   []Unatt  []TENS instruct                 []IFC  []Premod []NMES                       []Other:  []w/US   []w/ice   []w/heat  Position:  Location:    []  Traction: [] Cervical       []Lumbar                       [] Prone          []Supine                       []Intermittent   []Continuous Lbs:  [] before manual  [] after manual    []  Ultrasound: []Continuous   [] Pulsed                           []1MHz   []3MHz Location:  W/cm2:    []  Ice     []  heat  []  Ice massage Position:  Location:    []  Vasopneumatic Device Pressure: [] lo [] med [] hi   Temp: [] lo [] med [] hi   [] Skin assessment post-treatment:  []intact []redness- no adverse reaction       []redness  adverse reaction:       46 min Therapeutic Exercise:  [x] See flow sheet :   Rationale: increase ROM and increase strength to improve the patients ability to return to her PLOF.      min Therapeutic Activity:  []  See flow sheet :   Rationale:       min Neuromuscular Re-education:  []  See flow sheet :   Rationale:   to improve the patients ability to      min Manual Therapy: Rationale:  to      min Gait Training:  ___ feet with ___ device on level surfaces with ___ level of assist   Rationale: With TE  TA   NR  GT   Misc Patient Education: [x] Review HEP    [] Progressed/Changed HEP based on:   [] positioning   [] body mechanics   [] transfers   [] heat/ice application        Pain Level (0-10 scale) post treatment: 0/10    ASSESSMENT/Changes in Function: Session began with exercise via self stretching via gastroc, PF, DF and great toe stretch. Followed by AROM via ABC's, AROM of the L ankle. Followed by 4-way ankle with graded resistance band. Continued towel scrunch, ankle IN/EV on towel, marble  (intrenstic)  and seated calf raises. With boot on, ambulation on level surface with adherence to weight bearing status, 25%. Plan to progress weight bearing NV. Plan to continue with POC. Patient will continue to benefit from skilled PT services to modify and progress therapeutic interventions, address functional mobility deficits, address ROM deficits, address strength deficits, analyze and cue movement patterns, analyze and modify body mechanics/ergonomics and assess and modify postural abnormalities to attain remaining goals.      [x]  See Plan of Care  []  See progress note/recertification  []  See Discharge Summary           PLAN  []  Upgrade activities as tolerated     [x]  Continue plan of care  []  Update interventions per flow sheet       []  Discharge due to:_  []  Other:_      REYNOLD Azar 1/10/2022  8:40 AM

## 2022-01-13 ENCOUNTER — APPOINTMENT (OUTPATIENT)
Dept: PHYSICAL THERAPY | Age: 27
End: 2022-01-13
Payer: OTHER GOVERNMENT

## 2022-01-18 ENCOUNTER — APPOINTMENT (OUTPATIENT)
Dept: PHYSICAL THERAPY | Age: 27
End: 2022-01-18
Payer: OTHER GOVERNMENT

## 2022-01-19 ENCOUNTER — HOSPITAL ENCOUNTER (OUTPATIENT)
Dept: PHYSICAL THERAPY | Age: 27
Discharge: HOME | End: 2022-01-19
Payer: OTHER GOVERNMENT

## 2022-01-19 PROCEDURE — 97110 THERAPEUTIC EXERCISES: CPT

## 2022-01-19 PROCEDURE — 97116 GAIT TRAINING THERAPY: CPT

## 2022-01-19 NOTE — PROGRESS NOTES
PT DAILY TREATMENT NOTE 8    Patient Name: Wilian Childers  Date:2022  : 1995  [x]  Patient  Verified  Payor: AGUILAR / Plan: Alirio Gibson 74 / Product Type:  /    In time:1300  Out time:1350  Total Treatment Time (min): 50  Total Timed Codes (min): 50  1:1 Treatment Time (min): 50   Visit #: 4 of 16    Treatment Area: Displaced oblique fracture of shaft of left tibia, subsequent encounter for closed fracture with routine healing [U94.921U]    SUBJECTIVE  Pt enters clinic using B axillary crutches with no reports of pain. Pain Level (0-10 scale): 0    Any medication changes, allergies to medications, adverse drug reactions, diagnosis change, or new procedure performed?: [x] No    [] Yes (see summary sheet for update)    OBJECTIVE  Modality rationale: Declined   Min Type Additional Details    [] Estim: []Att   []Unatt  []TENS instruct                 []IFC  []Premod []NMES                       []Other:  []w/US   []w/ice   []w/heat  Position:  Location:    []  Traction: [] Cervical       []Lumbar                       [] Prone          []Supine                       []Intermittent   []Continuous Lbs:  [] before manual  [] after manual    []  Ultrasound: []Continuous   [] Pulsed                           []1MHz   []3MHz Location:  W/cm2:    []  Ice     []  heat  []  Ice massage Position:  Location:    []  Vasopneumatic Device Pressure: [] lo [] med [] hi   Temp: [] lo [] med [] hi   [] Skin assessment post-treatment:  []intact []redness- no adverse reaction       []redness  adverse reaction:     40 min Therapeutic Exercise:  [x] See flow sheet :   Rationale: increase ROM, increase strength, improve coordination, improve balance and increase proprioception to improve the patients ability to reach personal goal of returning to full duty at work. 10 min Gait Trainin ft one level surface using one axillary crutch, I. Pt progressed to 75% weightbearing. Rationale:  To achieve 100% weight bearing. With TE  TA   NR  GT   [facility] Patient Education: [x] Review HEP    [] Progressed/Changed HEP based on:   [] positioning   [] body mechanics   [] transfers   [] heat/ice application        Pain Level (0-10 scale) post treatment: 0    ASSESSMENT/Changes in Function: Continued with exercises per flowsheet working to increase L ankle stability, strength, and AROM. Demonstration provided to ensure proper form with tactile cueing with 4-way ankle and marble . Weight bearing progressed to 75% with one single crutch. Gait training began in parallel bars ensuring coordination and gait pattern. Pt was able to ambulate with no LOB using a swing through gait. Will continue POC progressing within protocol. Patient will continue to benefit from skilled PT services to modify and progress therapeutic interventions, address functional mobility deficits, address ROM deficits, address strength deficits, analyze and address soft tissue restrictions, analyze and cue movement patterns, analyze and modify body mechanics/ergonomics, assess and modify postural abnormalities and address imbalance/dizziness to attain remaining goals.      [x]  See Plan of Care  []  See progress note/recertification  []  See Discharge Summary         PLAN  []  Upgrade activities as tolerated     [x]  Continue plan of care  []  Update interventions per flow sheet       []  Discharge due to:_  []  Other:_      REYNOLD Banks  1/19/2022  3:41 PM

## 2022-01-24 ENCOUNTER — HOSPITAL ENCOUNTER (OUTPATIENT)
Dept: PHYSICAL THERAPY | Age: 27
Discharge: HOME | End: 2022-01-24
Payer: OTHER GOVERNMENT

## 2022-01-24 PROCEDURE — 97116 GAIT TRAINING THERAPY: CPT

## 2022-01-24 PROCEDURE — 97110 THERAPEUTIC EXERCISES: CPT

## 2022-01-24 NOTE — PROGRESS NOTES
PT DAILY TREATMENT NOTE 8    Patient Name: Juancarlos Guy  Date:2022  : 1995  [x]  Patient  Verified  Payor: AGUILAR / Plan: Alirio Gibson 74 / Product Type: Emma Stagers /    In DCGX:[id number]  Out time: 0945  Total Treatment Time (min): 50  Total Timed Codes (min): 50  1:1 Treatment Time (min): 50   Visit #: 5 of 16    Treatment Area: Displaced oblique fracture of shaft of left tibia, subsequent encounter for closed fracture with routine healing [C31.245T]    SUBJECTIVE  Pt enters clinic using B axillary crutches with no reports of pain. Boot on L LE. Pain Level (0-10 scale): 0    Any medication changes, allergies to medications, adverse drug reactions, diagnosis change, or new procedure performed?: [x] No    [] Yes (see summary sheet for update)    OBJECTIVE  Modality rationale: Declined   Min Type Additional Details    [] Estim: []Att   []Unatt  []TENS instruct                 []IFC  []Premod []NMES                       []Other:  []w/US   []w/ice   []w/heat  Position:  Location:    []  Traction: [] Cervical       []Lumbar                       [] Prone          []Supine                       []Intermittent   []Continuous Lbs:  [] before manual  [] after manual    []  Ultrasound: []Continuous   [] Pulsed                           []1MHz   []3MHz Location:  W/cm2:    []  Ice     []  heat  []  Ice massage Position:  Location:    []  Vasopneumatic Device Pressure: [] lo [] med [] hi   Temp: [] lo [] med [] hi   [] Skin assessment post-treatment:  []intact []redness- no adverse reaction       []redness  adverse reaction:     40 min Therapeutic Exercise:  [x] See flow sheet :   Rationale: increase ROM, increase strength, improve coordination, improve balance and increase proprioception to improve the patients ability to reach personal goal of returning to full duty at work. 10 min Gait Trainin ft one level surface using one axillary crutch, I. Pt continued at 75% weightbearing. Rationale: To achieve 100% weight bearing. With TE  TA   NR  GT   Misc Patient Education: [x] Review HEP    [] Progressed/Changed HEP based on:   [] positioning   [] body mechanics   [] transfers   [] heat/ice application        Pain Level (0-10 scale) post treatment: 0    ASSESSMENT/Changes in Function:  Session began with introduction of stepper with patient using boot on L LE. Continued with exercises per flowsheet working to increase L ankle stability, strength, and AROM. Demonstration provided to ensure proper form with tactile cueing with 4-way ankle and marble . Weight bearing continued at  75% with one single crutch. Gait training began in parallel bars ensuring coordination and gait pattern. Pt was able to ambulate with no LOB using a swing through gait. Will continue POC progressing within protocol including weight bearing to 100% NV. Patient will continue to benefit from skilled PT services to modify and progress therapeutic interventions, address functional mobility deficits, address ROM deficits, address strength deficits, analyze and address soft tissue restrictions, analyze and cue movement patterns, analyze and modify body mechanics/ergonomics, assess and modify postural abnormalities and address imbalance/dizziness to attain remaining goals.      [x]  See Plan of Care  []  See progress note/recertification  []  See Discharge Summary         PLAN  []  Upgrade activities as tolerated     [x]  Continue plan of care  []  Update interventions per flow sheet       []  Discharge due to:_  []  Other:_      REYNOLD Zhang  1/24/2022  9:50 AM

## 2022-01-27 ENCOUNTER — HOSPITAL ENCOUNTER (OUTPATIENT)
Dept: PHYSICAL THERAPY | Age: 27
Discharge: HOME | End: 2022-01-27
Payer: OTHER GOVERNMENT

## 2022-01-27 ENCOUNTER — OFFICE VISIT (OUTPATIENT)
Dept: ORTHOPEDIC SURGERY | Age: 27
End: 2022-01-27
Payer: OTHER GOVERNMENT

## 2022-01-27 DIAGNOSIS — M25.572 LEFT ANKLE PAIN, UNSPECIFIED CHRONICITY: Primary | ICD-10-CM

## 2022-01-27 PROCEDURE — 97110 THERAPEUTIC EXERCISES: CPT

## 2022-01-27 PROCEDURE — 99024 POSTOP FOLLOW-UP VISIT: CPT | Performed by: ORTHOPAEDIC SURGERY

## 2022-01-27 RX ORDER — FLUCONAZOLE 150 MG/1
TABLET ORAL
COMMUNITY
Start: 2021-04-20

## 2022-01-27 RX ORDER — OXYCODONE AND ACETAMINOPHEN 5; 325 MG/1; MG/1
TABLET ORAL
COMMUNITY
Start: 2021-10-12

## 2022-01-27 RX ORDER — HYDROCODONE BITARTRATE AND ACETAMINOPHEN 5; 325 MG/1; MG/1
TABLET ORAL
COMMUNITY
Start: 2021-10-10

## 2022-01-27 NOTE — PROGRESS NOTES
Name: Juancarlos Guy    : 1995     Service Dept: [facility]    Chief Complaint   Patient presents with    Ankle Pain        There were no vitals taken for this visit. No Known Allergies     Current Outpatient Medications   Medication Sig Dispense Refill    fluconazole (DIFLUCAN) 150 mg tablet       HYDROcodone-acetaminophen (NORCO) 5-325 mg per tablet       oxyCODONE-acetaminophen (PERCOCET) 5-325 mg per tablet       ibuprofen (MOTRIN) 800 mg tablet Take 1 Tablet by mouth three (3) times daily as needed for Pain. 60 Tablet 1    baclofen (LIORESAL) 20 mg tablet Take 1 Tablet by mouth three (3) times daily as needed for Pain. 30 Tablet 1    citalopram (CELEXA) 10 mg tablet TAKE 1 TABLET BY MOUTH ONCE DAILY FOR 30 DAYS      levonorgestrel (KYLEENA IU) by IntraUTERine route. There is no problem list on file for this patient. History reviewed. No pertinent family history. Social History     Socioeconomic History    Marital status:    Tobacco Use    Smoking status: Never Smoker    Smokeless tobacco: Never Used   Substance and Sexual Activity    Drug use: Not Currently      History reviewed. No pertinent surgical history. History reviewed. No pertinent past medical history. I have reviewed and agree with [address] [place] [address] Nw and ROS and intake form in chart and the record furthermore I have reviewed prior medical record(s) regarding this patients care during this appointment. Review of Systems:   Patient is a pleasant appearing individual, appropriately dressed, well hydrated, well nourished, who is alert, appropriately oriented for age, and in no acute distress with a normal gait and normal affect who does not appear to be in any significant pain. Physical Exam:  Left Ankle - No point tenderness, Full range of motion, No instability, No Weakness, No, skin lesions, No swelling, No instability, Grossly neurovascularly intact.     Right Ankle - No point tenderness, Full range of motion, No instability, No Weakness, No, skin lesions, No swelling, No instability, Grossly neurovascularly intact. Encounter Diagnoses     ICD-10-CM ICD-9-CM   1. Left ankle pain, unspecified chronicity  M25.572 719.47       HPI:  The patient is here with a chief complaint of left leg pain diagnosed with tib-fib fracture which has been treated conservative. X-rays show well-aligned fracture with early callus formation of her tib-fib. She has been using CAM boot. Assessment/Plan:  Plan at this point, progress with weightbearing. We will see her back as needed. No jumping or running for about two months. If she gets worse, she is to give me call. As part of continued conservative pain management options the patient was advised to utilize Tylenol or OTC NSAIDS as long as it is not medically contraindicated. Return to Office: Follow-up and Dispositions    · Return if symptoms worsen or fail to improve. Scribed by Chantel Mcpherson LPN as dictated by [facility] [place] KATHY Ramesh MD.  Documentation True and Accepted Jimbo Ramesh MD

## 2022-01-27 NOTE — PATIENT INSTRUCTIONS
Foot Pain: Care Instructions  Your Care Instructions     Foot injuries that cause pain and swelling are fairly common. Almost all sports or home repair projects can cause a misstep that ends up as foot pain. Normal wear and tear, especially as you get older, also can cause foot pain. Most minor foot injuries will heal on their own, and home treatment is usually all you need to do. If you have a severe injury, you may need tests and treatment. Follow-up care is a key part of your treatment and safety. Be sure to make and go to all appointments, and call your doctor if you are having problems. It's also a good idea to know your test results and keep a list of the medicines you take. How can you care for yourself at home? · Take pain medicines exactly as directed. ? If the doctor gave you a prescription medicine for pain, take it as prescribed. ? If you are not taking a prescription pain medicine, ask your doctor if you can take an over-the-counter medicine. · Rest and protect your foot. Take a break from any activity that may cause pain. · Put ice or a cold pack on your foot for 10 to 20 minutes at a time. Put a thin cloth between the ice and your skin. · Prop up the sore foot on a pillow when you ice it or anytime you sit or lie down during the next 3 days. Try to keep it above the level of your heart. This will help reduce swelling. · Your doctor may recommend that you wrap your foot with an elastic bandage. Keep your foot wrapped for as long as your doctor advises. · If your doctor recommends crutches, use them as directed. · Wear roomy footwear. · As soon as pain and swelling end, begin gentle exercises of your foot. Your doctor can tell you which exercises will help. When should you call for help? Call 911 anytime you think you may need emergency care. For example, call if:    · Your foot turns pale, white, blue, or cold.    Call your doctor now or seek immediate medical care if:    · You cannot move or stand on your foot.     · Your foot looks twisted or out of its normal position.     · Your foot is not stable when you step down.     · You have signs of infection, such as:  ? Increased pain, swelling, warmth, or redness. ? Red streaks leading from the sore area. ? Pus draining from a place on your foot. ? A fever.     · Your foot is numb or tingly. Watch closely for changes in your health, and be sure to contact your doctor if:    · You do not get better as expected.     · You have bruises from an injury that last longer than 2 weeks. Where can you learn more? Go to http://www.[employer].com/  Enter D999 in the search box to learn more about \"Foot Pain: Care Instructions. \"  Current as of: July 1, 2021               Content Version: 13.0  © 5932-2436 [employer]. Care instructions adapted under license by [employer] (which disclaims liability or warranty for this information). If you have questions about a medical condition or this instruction, always ask your healthcare professional. [address] any warranty or liability for your use of this information.

## 2022-01-27 NOTE — PROGRESS NOTES
PT DAILY TREATMENT NOTE 8    Patient Name: Karla Gutierrez  Date:2022  : 1995  [x]  Patient  Verified  Payor: AGUILAR / Plan: Alirio Gibson 74 / Product Type: Jane Wittdgchetan /    In time:1141 Out time:1236  Total Treatment Time (min): 55  Total Timed Codes (min): 55  1:1 Treatment Time (min): 55  Visit #: 6 of 16    Treatment Area: Displaced oblique fracture of shaft of left tibia, subsequent encounter for closed fracture with routine healing [U89.390Q]    SUBJECTIVE  Pt had no new c/o today. Excited to be full weight bearing as of today's session. Pain Level (0-10 scale): 0    Any medication changes, allergies to medications, adverse drug reactions, diagnosis change, or new procedure performed?: [x] No    [] Yes (see summary sheet for update)        OBJECTIVE  Modality rationale: No modalities needed/or requested today.     Min Type Additional Details    [] Estim: []Att   []Unatt  []TENS instruct                 []IFC  []Premod []NMES                       []Other:  []w/US   []w/ice   []w/heat  Position:  Location:    []  Traction: [] Cervical       []Lumbar                       [] Prone          []Supine                       []Intermittent   []Continuous Lbs:  [] before manual  [] after manual    []  Ultrasound: []Continuous   [] Pulsed                           []1MHz   []3MHz Location:  W/cm2:    []  Ice     []  heat  []  Ice massage Position:  Location:    []  Vasopneumatic Device Pressure: [] lo [] med [] hi   Temp: [] lo [] med [] hi   [] Skin assessment post-treatment:  []intact []redness- no adverse reaction       []redness  adverse reaction:       55 min Therapeutic Exercise:  [x] See flow sheet :   Rationale: increase ROM, increase strength, improve coordination and improve balance to improve the patients ability to return to prior level of function before injury/illness with reduced pain, achieving optimal strength and function to perform household tasks, daily activities, and return to community events, and/or work. With TE  TA   NR  GT   Misc Patient Education: [x] Review HEP    [] Progressed/Changed HEP based on:   [] positioning   [] body mechanics   [] transfers   [] heat/ice application        Patient's response to today's treatment: Began session with active warm up on stepper today with boot. Doffed boot for remainder of session. Progressed exercises today with FWB strengthening and ambulation without boot. Pt able to ambulate with boot and no AD 1 lap, followed by 2 more laps latter in session with only regular shoe and no AD. Performed HSS/Gastroc stretch on slant box, and progressed HR to standing, also pain free. Pt making excellent progress. FU with physician today in hopes that boot will be DC'd for good. Cont POC. Pain Level (0-10 scale) post treatment:0    ASSESSMENT/Changes in Function: Continued with POC with exercises as noted per flow sheet. Pt able to tolerate today's session with minimal increase in pain, which resolved post exercise. Will cont to progress as able. Patient will continue to benefit from skilled PT services to modify and progress therapeutic interventions, address functional mobility deficits, address ROM deficits, analyze and address soft tissue restrictions and analyze and cue movement patterns to attain remaining goals.      [x]  See Plan of Care  []  See progress note/recertification  []  See Discharge Summary           PLAN  []  Upgrade activities as tolerated     [x]  Continue plan of care  []  Update interventions per flow sheet       []  Discharge due to:_  []  Other:_      REYNOLD Ambriz 1/27/2022  12:46 PM

## 2022-02-03 ENCOUNTER — HOSPITAL ENCOUNTER (OUTPATIENT)
Dept: PHYSICAL THERAPY | Age: 27
Discharge: HOME | End: 2022-02-03
Payer: OTHER GOVERNMENT

## 2022-02-03 PROCEDURE — 97110 THERAPEUTIC EXERCISES: CPT

## 2022-02-03 PROCEDURE — 97016 VASOPNEUMATIC DEVICE THERAPY: CPT

## 2022-02-03 NOTE — PROGRESS NOTES
PT DAILY TREATMENT NOTE     Patient Name: Karla Gutierrez  ALND:[id number]  : 1995  [x]  Patient  Verified  Payor: AGUILAR / Plan: Alirio Gibson 74 / Product Type: Cedrickdanette [employer] /    In TTKO:[id number]  Out time: 0950  Total Treatment Time (min): 65  Total Timed Codes (min): 55  1:1 Treatment Time (min): 55  Visit #: 7 of 16    Treatment Area: Displaced oblique fracture of shaft of left tibia, subsequent encounter for closed fracture with routine healing [S85.768Q]    SUBJECTIVE  Pt reports that she has a pain along the ankle on the outer side that \"just feels different. Unable to really describe it . \"  Patient recently has a follow up with Dr. Hugh Cervantes and was advised to come out of the boot at this time and to come back as needed for any follow up. Pain Level (0-10 scale): 0    Any medication changes, allergies to medications, adverse drug reactions, diagnosis change, or new procedure performed?: [x] No    [] Yes (see summary sheet for update)        OBJECTIVE  Modality rationale: Vaso compression applied post exercise to aid in pain reduction from exercise an to heal optimally.     Min Type Additional Details    [] Estim: []Att   []Unatt  []TENS instruct                 []IFC  []Premod []NMES                       []Other:  []w/US   []w/ice   []w/heat  Position:  Location:    []  Traction: [] Cervical       []Lumbar                       [] Prone          []Supine                       []Intermittent   []Continuous Lbs:  [] before manual  [] after manual    []  Ultrasound: []Continuous   [] Pulsed                           []1MHz   []3MHz Location:  W/cm2:    []  Ice     []  heat  []  Ice massage Position:  Location:   10 [x]  Vasopneumatic Device Pressure: [x] lo [] med [] hi   Temp: [x] lo [] med [] hi   [] Skin assessment post-treatment:  []intact []redness- no adverse reaction       []redness  adverse reaction:       45 min Therapeutic Exercise:  [x] See flow sheet :   Rationale: increase ROM, increase strength, improve coordination and improve balance to improve the patients ability to return to prior level of function before injury/illness with reduced pain, achieving optimal strength and function to perform household tasks, daily activities, and return to community events, and/or work. With TE  TA   NR  GT   [facility] Patient Education: [x] Review HEP    [] Progressed/Changed HEP based on:   [] positioning   [] body mechanics   [] transfers   [] heat/ice application        Patient's response to today's treatment: Began session with AROM with no pain per patient and therapist able to palpate with no tenderness not pain evoked. Continued exercise in seated position for AROM of L ankle, 4 way ankle with resistated graded band. Continued exercises today with FWB strengthening and ambulation without boot. Pt able to ambulate  no AD and 3 laps with only regular shoe and no AD. Demonstration and verbal cues to avoid compensation of L lean with her posture and stiff knee gait. Performed HSS/Gastroc stretch on slant box, and progressed HR to standing at end of // on slant, also pain free. Introduced step on/off Airex pad with involved and noninvoled LE's, weight shifting and wobble board in standing in PF/DF and Inv and eversion. Stair ascend and descending with step to gait pattern initially and then step over gait pattern with UE support. Pt making excellent progress however tends to be apprehensive at times with challenges. Vaso compression applied to L ankle post exercise. Cont POC. Pain Level (0-10 scale) post treatment:0    ASSESSMENT/Changes in Function: Continued with POC with exercises as noted per flow sheet. Pt able to tolerate today's session with minimal increase in pain, which resolved post exercise. Will cont to progress as able.       Patient will continue to benefit from skilled PT services to modify and progress therapeutic interventions, address functional mobility deficits, address ROM deficits, analyze and address soft tissue restrictions and analyze and cue movement patterns to attain remaining goals.      [x]  See Plan of Care  []  See progress note/recertification  []  See Discharge Summary           PLAN  []  Upgrade activities as tolerated     [x]  Continue plan of care  []  Update interventions per flow sheet       []  Discharge due to:_  []  Other:_      REYNOLD Newman 2/3/2022  10:00 AM

## 2022-02-07 ENCOUNTER — HOSPITAL ENCOUNTER (OUTPATIENT)
Dept: PHYSICAL THERAPY | Age: 27
Discharge: HOME | End: 2022-02-07
Payer: OTHER GOVERNMENT

## 2022-02-07 PROCEDURE — 97110 THERAPEUTIC EXERCISES: CPT

## 2022-02-07 NOTE — PROGRESS NOTES
PT DAILY TREATMENT NOTE 8    Patient Name: Arya Joy  Date:2022  : 1995  [x]  Patient  Verified  Payor:  / Plan: Alirio Gibson 74 / Product Type:  /    In time:933 Out time:1024  Total Treatment Time (min): 51  Total Timed Codes (min): 51  1:1 Treatment Time (min): 51  Visit #: 8 of 16    Treatment Area: Displaced oblique fracture of shaft of left tibia, subsequent encounter for closed fracture with routine healing [S82.299J]    SUBJECTIVE  Pt had no new c/o today. Stated a little soreness but understands that is normal. Pt ambulated into clinic with no boot and no AD. Demonstrates slight antalgic gait. Pain Level (0-10 scale): 0    Any medication changes, allergies to medications, adverse drug reactions, diagnosis change, or new procedure performed?: [x] No    [] Yes (see summary sheet for update)        OBJECTIVE  Modality rationale: No modalities needed/or requested today.     Min Type Additional Details    [] Estim: []Att   []Unatt  []TENS instruct                 []IFC  []Premod []NMES                       []Other:  []w/US   []w/ice   []w/heat  Position:  Location:    []  Traction: [] Cervical       []Lumbar                       [] Prone          []Supine                       []Intermittent   []Continuous Lbs:  [] before manual  [] after manual    []  Ultrasound: []Continuous   [] Pulsed                           []1MHz   []3MHz Location:  W/cm2:    []  Ice     []  heat  []  Ice massage Position:  Location:    []  Vasopneumatic Device Pressure: [] lo [] med [] hi   Temp: [] lo [] med [] hi   [] Skin assessment post-treatment:  []intact []redness- no adverse reaction       []redness  adverse reaction:       51 min Therapeutic Exercise:  [x] See flow sheet :   Rationale: increase ROM, increase strength, improve coordination and improve balance to improve the patients ability to return to prior level of function before injury/illness with reduced pain, achieving optimal strength and function to perform household tasks, daily activities, and return to community events, and/or work. With TE  TA   NR  GT   [facility] Patient Education: [x] Review HEP    [] Progressed/Changed HEP based on:   [] positioning   [] body mechanics   [] transfers   [] heat/ice application        Patient's response to today's treatment: Began session with active warm up on stepper today witout boot. Exercises today with FWB strengthening and ambulation without boot. Pt able to ambulate with tennis shoes and no AD 2+ laps, followed by 2 more laps latter in session with only regular shoe and no AD. Performed HSS/Gastroc stretch on slant box, and progressed HR to standing, also pain free. Pt making excellent progress. Cont POC. Pain Level (0-10 scale) post treatment:0    ASSESSMENT/Changes in Function: Continued with POC with exercises as noted per flow sheet. Pt able to tolerate today's session with minimal increase in pain, which resolved post exercise. Will cont to progress as able. Patient will continue to benefit from skilled PT services to modify and progress therapeutic interventions, address functional mobility deficits, address ROM deficits, analyze and address soft tissue restrictions and analyze and cue movement patterns to attain remaining goals.      [x]  See Plan of Care  []  See progress note/recertification  []  See Discharge Summary           PLAN  []  Upgrade activities as tolerated     [x]  Continue plan of care  []  Update interventions per flow sheet       []  Discharge due to:_  []  Other:_      REYNOLD Roldan 2/7/2022  12:46 PM

## 2022-02-09 ENCOUNTER — HOSPITAL ENCOUNTER (OUTPATIENT)
Dept: PHYSICAL THERAPY | Age: 27
Discharge: HOME | End: 2022-02-09
Payer: OTHER GOVERNMENT

## 2022-02-09 PROCEDURE — 97110 THERAPEUTIC EXERCISES: CPT

## 2022-02-09 PROCEDURE — 97116 GAIT TRAINING THERAPY: CPT

## 2022-02-09 NOTE — PROGRESS NOTES
PT DAILY TREATMENT NOTE     Patient Name: Madina Luis  Date:2022  : 1995  [x]  Patient  Verified  Payor: AGUILAR / Plan: Alirio Gibson 74 / Product Type: Juan Medina /    In time:930  Out time:1013  Total Treatment Time (min): 43  Total Timed Codes (min): 43  1:1 Treatment Time (min): 43   Visit #: 9 of 16    Treatment Area: Displaced oblique fracture of shaft of left tibia, subsequent encounter for closed fracture with routine healing [S82.358P]    SUBJECTIVE  Pt enters clinic for the first time in pain. She states pain started last night and has consistent stayed at a 2/10 along tibial shaft. Pt enters with an antalgic gait. Pain Level (0-10 scale): 1    Any medication changes, allergies to medications, adverse drug reactions, diagnosis change, or new procedure performed?: [x] No    [] Yes (see summary sheet for update)    OBJECTIVE  Modality rationale: Declined   Min Type Additional Details    [] Estim: []Att   []Unatt  []TENS instruct                 []IFC  []Premod []NMES                       []Other:  []w/US   []w/ice   []w/heat  Position:  Location:    []  Traction: [] Cervical       []Lumbar                       [] Prone          []Supine                       []Intermittent   []Continuous Lbs:  [] before manual  [] after manual    []  Ultrasound: []Continuous   [] Pulsed                           []1MHz   []3MHz Location:  W/cm2:    []  Ice     []  heat  []  Ice massage Position:  Location:    []  Vasopneumatic Device Pressure: [] lo [] med [] hi   Temp: [] lo [] med [] hi   [] Skin assessment post-treatment:  []intact []redness- no adverse reaction       []redness  adverse reaction:     30 min Therapeutic Exercise:  [x] See flow sheet :   Rationale: increase ROM, increase strength, improve coordination, improve balance and increase proprioception to improve the patients ability to reach personal goal of returning to the gym.      10 min Gait Trainin ft on level surfaces, I.    Rationale: Focused on achieving a normalized symmetrical gait pattern            With TE  TA   NR  GT   [facility] Patient Education: [x] Review HEP    [] Progressed/Changed HEP based on:   [] positioning   [] body mechanics   [] transfers   [] heat/ice application        Pain Level (0-10 scale) post treatment: 2    ASSESSMENT/Changes in Function: Session began with stepper followed by stretches to hamstring and gastroc. Continued to address range of motion deficits through the wobble board and the introduction on the [employer]. Gait training continue focusing on equal weight bearing and stance time. Pt exhibited a better gait pattern upon completion. Pt was limited by pain so reverted back to [facility] exercises. Pt instructed to rest, ice, elevate, and hold on compression socks. Modalities declined today. Patient will continue to benefit from skilled PT services to modify and progress therapeutic interventions, address functional mobility deficits, address ROM deficits, address strength deficits, analyze and address soft tissue restrictions, analyze and cue movement patterns, analyze and modify body mechanics/ergonomics and address imbalance/dizziness to attain remaining goals.      [x]  See Plan of Care  []  See progress note/recertification  []  See Discharge Summary         PLAN  []  Upgrade activities as tolerated     [x]  Continue plan of care  []  Update interventions per flow sheet       []  Discharge due to:_  []  Other:_      REYNOLD Banks  2/9/2022  10:20 AM

## 2022-02-10 ENCOUNTER — APPOINTMENT (OUTPATIENT)
Dept: PHYSICAL THERAPY | Age: 27
End: 2022-02-10
Payer: OTHER GOVERNMENT

## 2022-02-15 ENCOUNTER — APPOINTMENT (OUTPATIENT)
Dept: PHYSICAL THERAPY | Age: 27
End: 2022-02-15
Payer: OTHER GOVERNMENT

## 2022-02-17 ENCOUNTER — HOSPITAL ENCOUNTER (OUTPATIENT)
Dept: PHYSICAL THERAPY | Age: 27
Discharge: HOME | End: 2022-02-17
Payer: OTHER GOVERNMENT

## 2022-02-17 PROCEDURE — 97116 GAIT TRAINING THERAPY: CPT

## 2022-02-17 PROCEDURE — 97110 THERAPEUTIC EXERCISES: CPT

## 2022-02-17 PROCEDURE — 97016 VASOPNEUMATIC DEVICE THERAPY: CPT

## 2022-02-17 NOTE — PROGRESS NOTES
PT DAILY TREATMENT NOTE 8-14    Patient Name: Isaura Lam  Date:2022  : 1995  [x]  Patient  Verified  Payor: AGUILAR / Plan: Alirio Gibson 74 / Product Type: Daniel Nazariodejuan /    In TCBO:5229  Out UHNK:5281  Total Treatment Time (min): 58  Total Timed Codes (min): 48  1:1 Treatment Time (min): 48   Visit # 10      Treatment Area: Displaced oblique fracture of shaft of left tibia, subsequent encounter for closed fracture with routine healing [S81.934J]    SUBJECTIVE  Pt denies complaints of pain upon arrival today, states that she would like to get back to working out.    Pain Level (0-10 scale): 0/10  Any medication changes, allergies to medications, adverse drug reactions, diagnosis change, or new procedure performed?: [x] No    [] Yes (see summary sheet for update)        OBJECTIVE  Modality rationale: decrease inflammation and decrease pain to improve the patients ability to move/heal optimally    Min Type Additional Details    [] Estim: []Att   []Unatt  []TENS instruct                 []IFC  []Premod []NMES                       []Other:  []w/US   []w/ice   []w/heat  Position:  Location:    []  Traction: [] Cervical       []Lumbar                       [] Prone          []Supine                       []Intermittent   []Continuous Lbs:  [] before manual  [] after manual    []  Ultrasound: []Continuous   [] Pulsed                           []1MHz   []3MHz Location:  W/cm2:         []  Ice     []  heat  []  Ice massage Position:  Location:   10 [x]  Vasopneumatic Device Pressure: [x] lo [] med [] hi   Temp: [x] lo [] med [] hi   [x] Skin assessment post-treatment:  [x]intact []redness- no adverse reaction       []redness - adverse reaction:       33 min Therapeutic Exercise:  [x] See flow sheet :   Rationale: increase ROM, increase strength, improve coordination, improve balance and increase proprioception to improve the patients ability to maximize pain-free daily activity, restore PLOF    15 min Gait Training:  >1000 feet on level surfaces, step training   Rationale: improve functional mobility independence, endurance, safety & confidence for full return to PLOF           With TE Patient Education: [x] Review HEP    [] Progressed/Changed HEP based on:   [x] positioning   [x] body mechanics   [] transfers   [] heat/ice application          Pain Level (0-10 scale) post treatment: 0/10    ASSESSMENT/Changes in Function: Session initiated today on stepper for active warm up followed by standing self-stretching with intermittent cues for proper set-up. Reassessment today of ankle ROM, strength & functional mobility. Increased emphasis today on stance symmetry, LLE weight acceptance, confidence with stair descent and continuity of stepping pattern progressed from // bars to open ambulation in gym. Pt advised on no running/jumping until April per chart review from ortho. Plan to progress CKC strengthening, unilateral stabilization and standing balance next week.      []  See Plan of Care  [x]  See progress note/recertification  []  See Discharge Summary             PLAN  []  Upgrade activities as tolerated     []  Continue plan of care  [x]  Update interventions per flow sheet       []  Discharge due to:_  []  Other:_      Ricky Thornton, PT, DPT 2/17/2022  8:56 AM

## 2022-02-17 NOTE — PROGRESS NOTES
[facility], [address] [place] [address]  Phone: [phone]    Fax: [phone]   Progress Note/CONTINUED PLAN OF CARE for PHYSICAL THERAPY          Patient Name: Mark Jha : 1995   Treatment/Medical Diagnosis: Displaced oblique fracture of shaft of left tibia, subsequent encounter for closed fracture with routine healing [S82.232D]   Onset Date: 10/6/21    Referral Source: Ashwini Danielle [facility]): 22   Prior Hospitalization: See Medical History Provider #: [id number]   Prior Level of Function: Independent   Comorbidities: none pertinent   Medications: Verified on Patient Summary List   Visits from [facility]: 10 Missed Visits: 2       Objective Measures: FOTO: 69  LEFS: 60.9     Gait:                []? Normal    []? Abnormal    [x]? Antalgic    []? NWB    Device: none                          Distance: 1000 feet on level surfaces  Comments: asymmetrical stance times       ROM / Strength  []? ? Unable to assess                            AROM                         PROM                       Strength       Left Right Left Right Left Right   Hip Flexion                 Extension                 Abduction                 Adduction               Knee Flexion                 Extension               Ankle Plantarflexion 45 37     4+/5  5/5     Dorsiflexion  Inversion  Eversion 15  30  35 15  41  16     4+/5   4+/5   4+/5* 5/5  5/5  5/5    * indicative of pain     Short Term Goals: 3 weeks  1. Pt will report compliance and demonstrate ability to correctly perform HEP.  MET. 2. Pt will demonstrate a 1/2 grade improvement in LLE MMT to be able to better ambulate with a normalized gait without pain. MET. 3. Pt will demonstrate L ankle PROM of 70 deg total DF/PF to facilitate increased ability to ascend/descend stairs. MET. 4. Pt will increase LEFS > 10 points to facilitate increased ability to perform functional activities of choice.   MET, LEFS improved from 28.4 at initial to 60.9.        Long Term Goals: 6 weeks  1. Pt will demonstrate a 2 grade improvement in LLE MMT to be able to better ambulate with a normalized gait without pain. Progressing. 2. Pt will demonstrate L ankle AROM of 80 deg or greater to be able to return to normal ambulation on level and unlevel surfaces. Progressing. 3. Pt will demonstrate the ability to safely ambulate > 500 feet without an AD without evidence of antalgia to be able to return to an ind walking program following discharge. Progressing. 4. Pt will increase LEFS > 20 points to facilitate increased ability to perform leisure activities of choice. MET, LEFS improved from 28.4 at initial to 60.9. Assessment/ Patient Update: Pt is s/p L tibia spiral fracture on 10/6/21 and pt has made measurable improvements in ankle ROM, strength & functional mobility independence over past 6 weeks. Pt is now WBAT and AROM in [facility] and pain-free. Ankle strength & decreased standing stability & balance prove to be most limiting at this this. Stair descent also proves difficult & a source of anxiety but pt able to complete consecutively with improvements observed each bout. Pt will benefit from continued skilled PT intervention to target deficits in effort to restore PLOF and assist with safe return to exercise. Problem List: pain affecting function, decrease ROM, decrease strength, impaired gait/ balance and decrease activity tolerance     Updated Plan of Care:    Treatment Plan to include the following per provider discretion: Therapeutic exercise, Neuromuscular re-education, Physical agent/modality, Gait/balance training, Manual therapy, Patient education, Functional mobility training and Stair training    Frequency / Duration:  Patient to be seen   2   times per week for   6  weeks          If you have any questions/comments please contact us directly at [phone].    Thank you for allowing us to assist in the care of your patient. Therapist Signature: Keiko Baptiste PT, DPT Date: 8/90/6239   Certification Period:  Reporting Period: 2/17/22 - 5/17/22 1/5/22 - 2/17/22 Time: 8:56 AM   NOTE TO PHYSICIAN:  PLEASE COMPLETE THE ORDERS BELOW AND FAX TO   [facility] Physical Therapy: [phone]. If you are unable to process this request in 24 hours please contact our office: [phone].    ___ I have read the above report and request that my patient continue as recommended.   ___ I have read the above report and request that my patient continue therapy with the following changes/special instructions: ________________________________________________   ___ I have read the above report and request that my patient be discharged from therapy.      Physician Signature:        Date:       Time:

## 2022-02-24 ENCOUNTER — APPOINTMENT (OUTPATIENT)
Dept: PHYSICAL THERAPY | Age: 27
End: 2022-02-24
Payer: OTHER GOVERNMENT

## 2022-12-01 NOTE — THERAPY DISCHARGE
[address] [place] [address] PHYSICAL THERAPY  [address] [place] [address] [address], [address] [facility], [address] * Phone: [phone] * Fax: [phone]  DISCHARGE SUMMARY FOR PHYSICAL THERAPY            Patient Name: Johana Putnam : 1995   Treatment/Medical Diagnosis: Displaced oblique fracture of shaft of left tibia, subsequent encounter for closed fracture with routine healing [S82.232D]   Onset Date: 10/6/21    Referral Source: Miriam Turner [facility]): 22   Prior Hospitalization: See Medical History Provider #: [id number]   Prior Level of Function: independent   Comorbidities: Non pertinent   Medications: Verified on Patient Summary List   [facility] from [facility]: 10 Missed Visits: 6       Subjective:  Pt denies complaints of pain upon arrival today, states that she would like to get back to working out.  (per last attended session on 22)    Objective:  FOTO: 69  LEFS: 60.9     Gait:                [] Normal    [] Abnormal    [x] Antalgic    [] NWB    Device: none                          Distance: 1000 feet on level surfaces  Comments: asymmetrical stance times        ROM / Strength  [] Unable to assess                            AROM                         PROM                       Strength       Left Right Left Right Left Right   Hip Flexion                 Extension                 Abduction                 Adduction               Knee Flexion                 Extension               Ankle Plantarflexion 45 37     4+/5  5/5     Dorsiflexion  Inversion  Eversion 15  30  35 15  41  16     4+/5   4+/5   4+/5* 5/5  5/5  5/5    * indicative of pain     Short Term Goals: 3 weeks  Pt will report compliance and demonstrate ability to correctly perform HEP.  MET, 22. Pt will demonstrate a 1/2 grade improvement in LLE MMT to be able to better ambulate with a normalized gait without pain. MET, 22.   Pt will demonstrate L ankle PROM of 70 deg total DF/PF to facilitate increased ability to ascend/descend stairs. MET, 2/17/22. Pt will increase LEFS > 10 points to facilitate increased ability to perform functional activities of choice. MET, 2/17/22. Long Term Goals: 6 weeks  Pt will demonstrate a 2 grade improvement in LLE MMT to be able to better ambulate with a normalized gait without pain. Not Met, progress measured. Pt will demonstrate L ankle AROM of 80 deg or greater to be able to return to normal ambulation on level and unlevel surfaces. Not Met, progress measured. Pt will demonstrate the ability to safely ambulate > 500 feet without an AD without evidence of antalgia to be able to return to an ind walking program following discharge. Not Met, progress measured. Pt will increase LEFS > 20 points to facilitate increased ability to perform leisure activities of choice. MET, 2/17/22. Key Functional Changes/Progress: Pt is s/p L tibia spiral fracture on 10/6/21 and pt has made measurable improvements in ankle ROM, strength & functional mobility independence over 6 weeks in outpatient rehabilitation. Pt WBAT and AROM in [facility] and pain-free. Ankle strength & decreased standing stability & balance proved to be most limiting. Stair descent also proved difficult & a source of anxiety but pt able to complete consecutively with improvements observed each bout. Assessments/Recommendations: Discontinue therapy due to lack of attendance or compliance. Pt did not attend remaining scheduled visits. If you have any questions/comments please contact us directly at [phone]. Thank you for allowing us to assist in the care of your patient. Therapist Signature: Emir Plata PT, DPT Date: 12/1/2022   Reporting Period: 1/5/22 - 2/17/22 Time: 0:09 PM      Certification Period: 1/5/22 - 4/30/22       NOTE TO PHYSICIAN:  PLEASE COMPLETE THE ORDERS BELOW AND FAX TO   [facility] Physical Therapy: [phone].   If you are unable to process this request in 24 hours please contact our office: [phone].    ___ I have read the above report and request that my patient be discharged from therapy.      Physician Signature:        Date:       Time: